# Patient Record
Sex: FEMALE | Race: BLACK OR AFRICAN AMERICAN | NOT HISPANIC OR LATINO | Employment: FULL TIME | URBAN - METROPOLITAN AREA
[De-identification: names, ages, dates, MRNs, and addresses within clinical notes are randomized per-mention and may not be internally consistent; named-entity substitution may affect disease eponyms.]

---

## 2017-05-04 ENCOUNTER — OFFICE VISIT (OUTPATIENT)
Dept: SURGERY | Facility: CLINIC | Age: 52
End: 2017-05-04
Payer: COMMERCIAL

## 2017-05-04 VITALS — BODY MASS INDEX: 37.89 KG/M2 | HEIGHT: 63 IN | WEIGHT: 213.88 LBS | TEMPERATURE: 98 F | RESPIRATION RATE: 16 BRPM

## 2017-05-04 DIAGNOSIS — K21.9 GASTROESOPHAGEAL REFLUX DISEASE, ESOPHAGITIS PRESENCE NOT SPECIFIED: ICD-10-CM

## 2017-05-04 DIAGNOSIS — M19.90 ARTHRITIS: ICD-10-CM

## 2017-05-04 DIAGNOSIS — E66.01 MORBID OBESITY DUE TO EXCESS CALORIES: Primary | ICD-10-CM

## 2017-05-04 PROCEDURE — 1160F RVW MEDS BY RX/DR IN RCRD: CPT | Mod: S$GLB,,, | Performed by: SURGERY

## 2017-05-04 PROCEDURE — 99204 OFFICE O/P NEW MOD 45 MIN: CPT | Mod: S$GLB,,, | Performed by: SURGERY

## 2017-05-04 RX ORDER — LANOLIN ALCOHOL/MO/W.PET/CERES
400 CREAM (GRAM) TOPICAL DAILY
COMMUNITY

## 2017-05-04 RX ORDER — SERTRALINE HYDROCHLORIDE 100 MG/1
100 TABLET, FILM COATED ORAL DAILY
COMMUNITY

## 2017-05-04 RX ORDER — OMEPRAZOLE 40 MG/1
40 CAPSULE, DELAYED RELEASE ORAL DAILY
COMMUNITY
End: 2018-06-14 | Stop reason: SDUPTHER

## 2017-05-04 RX ORDER — ASPIRIN 81 MG/1
81 TABLET ORAL DAILY
COMMUNITY

## 2017-05-04 RX ORDER — CHOLECALCIFEROL (VITAMIN D3) 25 MCG
3000 TABLET ORAL DAILY
COMMUNITY
End: 2018-06-14

## 2017-05-04 NOTE — MR AVS SNAPSHOT
[facility]  [address] [place] [address]  Phone: [phone]  Fax: [phone]                  Simon Pascual   2017 9:40 AM   Office Visit    Description:  Unknown : 1965   Provider:  Faith King MD   Department:  [facility]           Reason for Visit     Obesity           Diagnoses this Visit        Comments    Morbid obesity due to excess calories    -  Primary     Obesity, Class II, BMI 35-39.9, with comorbidity         Arthritis         Gastroesophageal reflux disease, esophagitis presence not specified                To Do List           Future Appointments        Provider Department Dept Phone    2017 9:30 AM Myriam Ramirez RD [place] [phone]    2017 9:20 AM Faith King MD [facility] [phone]      Goals (5 Years of Data)     None      Follow-Up and Disposition     Return in about 1 month (around 2017).      [facility] Call     [facility] On Call Nurse Care Line -  Assistance  Unless otherwise directed by your provider, please contact [facility] On-Call, our nurse care line that is available for  assistance.     Registered nurses in the [facility] On Call Center provide: appointment scheduling, clinical advisement, health education, and other advisory services.  Call: [phone] (toll free)               Medications           Message regarding Medications     Verify the changes and/or additions to your medication regime listed below are the same as discussed with your clinician today.  If any of these changes or additions are incorrect, please notify your healthcare provider.             Verify that the below list of medications is an accurate representation of the medications you are currently taking.  If none reported, the list may be blank. If incorrect, please contact your healthcare provider. Carry this list with you in case of emergency.     "       Current Medications     aspirin (ECOTRIN) 81 MG EC tablet Take 81 mg by mouth once daily.    aspirin-acetaminophen-caffeine 250-250-65 mg (EXCEDRIN MIGRAINE) 250-250-65 mg per tablet Take 1 tablet by mouth every 6 (six) hours as needed for Pain.    magnesium oxide (MAG-OX) 400 mg tablet Take 400 mg by mouth once daily.    omeprazole (PRILOSEC) 40 MG capsule Take 40 mg by mouth once daily.    PRENATAL VIT/IRON FUMARATE/FA (PRENATAL 1+1 ORAL) Take by mouth.    RIBOFLAVIN (VITAMIN B-2 ORAL) Take 200 mg by mouth 2 (two) times daily.    sertraline (ZOLOFT) 100 MG tablet Take 100 mg by mouth once daily.    vitamin D 1000 units Tab Take 3,000 Units by mouth once daily.           Clinical Reference Information           Your Vitals Were     Temp Resp Height Weight BMI    98.1 °F (36.7 °C) (Oral) 16 5' 2.5" (1.588 m) 97 kg (213 lb 14.4 oz) 38.5 kg/m2      Allergies as of 5/4/2017     No Known Allergies      Immunizations Administered on Date of Encounter - 5/4/2017     None      Language Assistance Services     ATTENTION: Language assistance services are available, free of charge. Please call [phone].      ATENCIÓN: Si julissala romel, tiene a godoy disposición servicios gratuitos de asistencia lingüística. Llame al [phone].     OLIVIA Ý: N?u b?n nói Ti?ng Vi?t, có các d?ch v? h? tr? ngôn ng? mi?n phí dành cho b?n. G?i s? [phone].         [facility] complies with applicable Federal civil rights laws and does not discriminate on the basis of race, color, national origin, age, disability, or sex.        "

## 2017-05-18 ENCOUNTER — NUTRITION (OUTPATIENT)
Dept: NUTRITION | Facility: CLINIC | Age: 52
End: 2017-05-18
Payer: COMMERCIAL

## 2017-05-18 VITALS — WEIGHT: 212 LBS | BODY MASS INDEX: 38.16 KG/M2

## 2017-05-18 DIAGNOSIS — E66.9 OBESITY, UNSPECIFIED OBESITY SEVERITY, UNSPECIFIED OBESITY TYPE: Primary | ICD-10-CM

## 2017-05-18 PROCEDURE — 97802 MEDICAL NUTRITION INDIV IN: CPT | Mod: S$GLB,,, | Performed by: DIETITIAN, REGISTERED

## 2017-05-18 NOTE — PROGRESS NOTES
PCP: Primary Doctor No  REFERRING PROVIDER:  Faith King MD      HISTORY OF PRESENT ILLNESS:  51 y.o. unknown patient is in clinic today for bariatric surgery assessment. Patient interested in gastric sleeve.     Patient denies nausea, vomiting and diarrhea. Weight difficulties for 10 years.  Weight loss strategies include Unsupervised: atkins, gym membership, herbal life, home gym equipment, slim fast; Supervised: Acupuncture, diet pills from gail MEDRANO, nutri-system; Diet pills: dexatrim, fastin, metabolife; Exercise attempts: treadmill, group classes, boxing.     VITAL SIGNS:  Height: 62.5 in  Weight: 212 lb   IBW: 113 lbs +/-10%    ALLERGIES & MEDICATIONS: Reviewed and Reconciled  MEDICAL/SURGICAL & FAMILY HISTORY: Reviewed and Reconciled    LABORATORY:  A1C: No results found for: HGBA1C  Chol: No results found for: CHOL  Trig: No results found for: TRIG  HDL: No results found for: HDL  LDL: No components found for: LDL   BUN:    No results found for: BUN  AST:  No results found for: AST      ALT:  No results found for: ALT  Micro/Cr Ratio:  No results found for: CREATININE    SELF-MONITORING:  Food - none are avail    ACTIVITY LEVEL: Aerobic 20-30 min 3-4 d/wk. Resistance none. Treadmill, stairs up and down, 3-4x a week- 2 miles     NUTRITION INTAKE: Meal patterns include 3 meals, 1-2 snacks daily   Wake up  B - pancakes and [place], coffee- 2 cups in morning and drinks more throughout the day   S - sleeps  5 pm L - bowl of cereal  D - take- sandwich, grapefruit   Beverages - lemon water, coffee   Dining out - rarely   Alcohol- glass of wine -2-3x a week   Supplements- MVI, mag-ox, b2, vit d   Protein shake- not yet     Night shift- 12 hr      Changes made to lifestyle since meeting with Dr. King- cut out rice, pasta, grits  If too tired will not eat and then will overeat     PSYCHOSOCIAL: Stage of change - preparation  Barriers to change - none  Motivation to change (10high): 8  Predicted  compliance (10high): 8  Realistic expectation for wt loss (10high): 8  Verbalizes understanding of dietary changes post procedure and willingness to participate in physical activity (10high): 9    MNT ASSESSMENT:   1200 calories, 60-70 grams protein daily  increase fruit 2 serv/d, vegetables 2+ cups/d, whole grains 3+serv/d, lowfat dairy 3+serv/d  low-fat, low-sodium  150 min physical activity per week, moderate intensity, as tolerated    PLAN:    Reviewed MNT guidelines for obesity and weight management.   Encouraged daily self-monitoring of food & activity patterns.    Provided pre & post surgery meal-planning instruction via bariatric diet manual, foodlists, plate method, food models, food labels and/or ADA booklet. Reviewed micro/macronutrient effect on weight management. Discussed carbohydrate counting and spacing techniques with emphasis on supplementation necessary for altered metabolism. Reviewed principles of energy metabolism to assist weight and health management.                                                          Discussed physical activity with review of benefits, methods, precautions.    Discussed bx strategies for improving, strategies for improving social & environmental support of lifestyle changes.     GOALS: Self monitoring: daily food & activity journal. Meal plan-90% accuracy, Physical activity-150 minutes per week.   FU: as needed per bariatric MD   Visit Time Spent:  60 minutes    Thank you for the opportunity to work with your patient.    Myriam Ramirez RD, LDN  Bariatric Dietitian

## 2017-06-01 ENCOUNTER — OFFICE VISIT (OUTPATIENT)
Dept: SURGERY | Facility: CLINIC | Age: 52
End: 2017-06-01
Payer: COMMERCIAL

## 2017-06-01 VITALS
DIASTOLIC BLOOD PRESSURE: 84 MMHG | HEART RATE: 63 BPM | HEIGHT: 63 IN | SYSTOLIC BLOOD PRESSURE: 139 MMHG | TEMPERATURE: 98 F | BODY MASS INDEX: 38.62 KG/M2 | WEIGHT: 218 LBS | RESPIRATION RATE: 16 BRPM

## 2017-06-01 DIAGNOSIS — M19.90 ARTHRITIS: ICD-10-CM

## 2017-06-01 DIAGNOSIS — E78.5 HYPERLIPIDEMIA, UNSPECIFIED HYPERLIPIDEMIA TYPE: ICD-10-CM

## 2017-06-01 DIAGNOSIS — E66.01 MORBID OBESITY DUE TO EXCESS CALORIES: Primary | ICD-10-CM

## 2017-06-01 PROCEDURE — 99213 OFFICE O/P EST LOW 20 MIN: CPT | Mod: S$GLB,,, | Performed by: SURGERY

## 2017-06-01 NOTE — PROGRESS NOTES
Medically Supervised Weight Loss Documentation    Date of Visit: 06/01/2017    Patient: Simon Pascual    Current Weight: 218  Current BMI: Body mass index is 39.24 kg/m².  Weight Change: +5 pounds    Last Weight: 213    Beginning Weight: 213      Vitals:   Vitals:    06/01/17 0854   BP: 139/84   Pulse: 63   Resp: 16   Temp: 98.2 °F (36.8 °C)       Comorbidities:   Past Medical History:   Diagnosis Date    Arthritis     in left hip, bilateral knees, and right shoulder    GERD (gastroesophageal reflux disease)     Hyperlipidemia     Migraine        Medications:  Current Outpatient Prescriptions on File Prior to Visit   Medication Sig Dispense Refill    aspirin (ECOTRIN) 81 MG EC tablet Take 81 mg by mouth once daily.      aspirin-acetaminophen-caffeine 250-250-65 mg (EXCEDRIN MIGRAINE) 250-250-65 mg per tablet Take 1 tablet by mouth every 6 (six) hours as needed for Pain.      magnesium oxide (MAG-OX) 400 mg tablet Take 400 mg by mouth once daily.      omeprazole (PRILOSEC) 40 MG capsule Take 40 mg by mouth once daily.      PRENATAL VIT/IRON FUMARATE/FA (PRENATAL 1+1 ORAL) Take by mouth.      RIBOFLAVIN (VITAMIN B-2 ORAL) Take 200 mg by mouth 2 (two) times daily.      sertraline (ZOLOFT) 100 MG tablet Take 100 mg by mouth once daily.      vitamin D 1000 units Tab Take 3,000 Units by mouth once daily.       No current facility-administered medications on file prior to visit.          Body comp:  Fat Percent:  44.4 %  Fat Mass:  97 lb  FFM:  121 lb  TBW: 88.5 lb  BMR: 1659 kcal      Diet Education Discussed:    Breakfast:  Oatmeal, grape fruit, boiled eggs, yogurt  Lunch:  salad  Dinner:  Baked ham, sometimes just vegetables alone  Snacks on popcorn    Exercise/Activity Discussed:    Treadmill 3 times per week for 60 minutes    Behavior or Diet Goals for this patient:    Weight gain is mainly from water retention  We talked about stopping oatmeal and fruits  We talked about finding a protein shake she likes  to use as a meal replacement  Stop popcorn  Continue exercising    Labs - reviewed, pending thyroid function, high cholesterol  EKG - request  UGI - normal  dietary consult- done  psych consult - pending  Seminar- done    1/3 MSD    I will obtain the following clearances prior to surgery: none    : I met with the patient for 15 minutes and counseled her for over 50% of that time

## 2017-07-13 ENCOUNTER — OFFICE VISIT (OUTPATIENT)
Dept: SURGERY | Facility: CLINIC | Age: 52
End: 2017-07-13
Payer: COMMERCIAL

## 2017-07-13 VITALS
RESPIRATION RATE: 16 BRPM | HEART RATE: 73 BPM | HEIGHT: 63 IN | TEMPERATURE: 98 F | SYSTOLIC BLOOD PRESSURE: 136 MMHG | BODY MASS INDEX: 37.56 KG/M2 | WEIGHT: 212 LBS | DIASTOLIC BLOOD PRESSURE: 79 MMHG

## 2017-07-13 DIAGNOSIS — M19.90 ARTHRITIS: ICD-10-CM

## 2017-07-13 DIAGNOSIS — E78.5 HYPERLIPIDEMIA, UNSPECIFIED HYPERLIPIDEMIA TYPE: ICD-10-CM

## 2017-07-13 DIAGNOSIS — E66.01 MORBID OBESITY DUE TO EXCESS CALORIES: Primary | ICD-10-CM

## 2017-07-13 PROCEDURE — 99213 OFFICE O/P EST LOW 20 MIN: CPT | Mod: S$GLB,,, | Performed by: SURGERY

## 2017-07-13 RX ORDER — GABAPENTIN 600 MG/1
600 TABLET ORAL DAILY
COMMUNITY

## 2017-07-13 NOTE — PROGRESS NOTES
Medically Supervised Weight Loss Documentation    Date of Visit: 07/13/2017    Patient: Simon Pascual    Current Weight: 212  Current BMI: Body mass index is 38.16 kg/m².  Weight Change: -1 pounds    Last Weight: 218    Beginning Weight: 213      Vitals:   Vitals:    07/13/17 0901   BP: 136/79   Pulse: 73   Resp: 16   Temp: 97.9 °F (36.6 °C)       Comorbidities:   Past Medical History:   Diagnosis Date    Arthritis     in left hip, bilateral knees, and right shoulder    GERD (gastroesophageal reflux disease)     Hyperlipidemia     Migraine        Medications:  Current Outpatient Prescriptions on File Prior to Visit   Medication Sig Dispense Refill    aspirin (ECOTRIN) 81 MG EC tablet Take 81 mg by mouth once daily.      aspirin-acetaminophen-caffeine 250-250-65 mg (EXCEDRIN MIGRAINE) 250-250-65 mg per tablet Take 1 tablet by mouth every 6 (six) hours as needed for Pain.      magnesium oxide (MAG-OX) 400 mg tablet Take 400 mg by mouth once daily.      omeprazole (PRILOSEC) 40 MG capsule Take 40 mg by mouth once daily.      RIBOFLAVIN (VITAMIN B-2 ORAL) Take 200 mg by mouth 2 (two) times daily.      sertraline (ZOLOFT) 100 MG tablet Take 100 mg by mouth once daily.      vitamin D 1000 units Tab Take 3,000 Units by mouth once daily.      [DISCONTINUED] PRENATAL VIT/IRON FUMARATE/FA (PRENATAL 1+1 ORAL) Take by mouth.       No current facility-administered medications on file prior to visit.          Body comp:  Fat Percent:  46.3 %  Fat Mass:  98 lb  FFM:  114 lb  TBW: 83.5 lb    BMR: 1633 kcal      Diet Education Discussed:    Breakfast:  salmon  Lunch:  Protein shake  Dinner:  2 eggs and coffee  Snack: none really    Exercise/Activity Discussed:    Stopped for pain in left foot    Behavior or Diet Goals for this patient:    Diet is doing well.  We talked about maintaining this over time and working on portion control.    We need to find a way to exercise. I recommended using a foot peddler for her arms  (examples shown to patient).  She has been diagnosed with arthritis in that foot and is getting an MRI from another physician to r/o stress fracture.    Osteoarthritis in left foot  Labs - reviewed, pending thyroid function, high cholesterol  EKG - request  UGI - normal  dietary consult- done  psych consult - pending  Seminar- done    2/3 MSD    I will obtain the following clearances prior to surgery: none    : I met with the patient for 15 minutes and counseled her for over 50% of that time

## 2017-08-10 ENCOUNTER — OFFICE VISIT (OUTPATIENT)
Dept: SURGERY | Facility: CLINIC | Age: 52
End: 2017-08-10
Payer: COMMERCIAL

## 2017-08-10 VITALS
HEIGHT: 63 IN | SYSTOLIC BLOOD PRESSURE: 138 MMHG | DIASTOLIC BLOOD PRESSURE: 87 MMHG | BODY MASS INDEX: 37.47 KG/M2 | TEMPERATURE: 99 F | HEART RATE: 78 BPM | RESPIRATION RATE: 16 BRPM | WEIGHT: 211.5 LBS

## 2017-08-10 DIAGNOSIS — M19.90 ARTHRITIS: ICD-10-CM

## 2017-08-10 DIAGNOSIS — E66.01 MORBID OBESITY DUE TO EXCESS CALORIES: Primary | ICD-10-CM

## 2017-08-10 DIAGNOSIS — E78.5 HYPERLIPIDEMIA, UNSPECIFIED HYPERLIPIDEMIA TYPE: ICD-10-CM

## 2017-08-10 PROCEDURE — 99214 OFFICE O/P EST MOD 30 MIN: CPT | Mod: S$GLB,,, | Performed by: SURGERY

## 2017-08-10 PROCEDURE — 3008F BODY MASS INDEX DOCD: CPT | Mod: S$GLB,,, | Performed by: SURGERY

## 2017-08-10 NOTE — PATIENT INSTRUCTIONS
Pre op Diet-Start Today    Breakfast- protein shake  Lunch- protein shake  Dinner- 3 ounces of meat and vegetables ( from list)    NO SNACKING  Only water to drink

## 2017-08-10 NOTE — PROGRESS NOTES
Follow up    SUBJECTIVE:     Chief Complaint   Patient presents with    Obesity       History of Present Illness:    Patient is a 51 y.o. female who is referred for evaluation of surgical treatment of morbid obesity. Her Body mass index is 38.07 kg/m².  She has known comorbidities of dyslipidemia, GERD, osteoarthritis, peripheral edema and urinary incontinence. She has attended the bariatric seminar and is most interested in gastric sleeve surgery.     Diet:  Breakfast: eggs sausage  Lunch: protein shake  Dinner: salmon  Snacks: peanuts- 1 small bag     Exercise:  Mowing lawn- weekly  Treadmill twice a week 40 minutes- 3 mph      Review of patient's allergies indicates:  No Known Allergies    Current Outpatient Prescriptions   Medication Sig Dispense Refill    aspirin (ECOTRIN) 81 MG EC tablet Take 81 mg by mouth once daily.      aspirin-acetaminophen-caffeine 250-250-65 mg (EXCEDRIN MIGRAINE) 250-250-65 mg per tablet Take 1 tablet by mouth every 6 (six) hours as needed for Pain.      gabapentin (NEURONTIN) 600 MG tablet Take 600 mg by mouth once daily.      magnesium oxide (MAG-OX) 400 mg tablet Take 400 mg by mouth once daily.      MULTIVIT-IRON-MIN-FOLIC ACID 3,500-18-0.4 UNIT-MG-MG ORAL CHEW Take by mouth.      MULTIVIT-MINERALS/FOLIC ACID (CENTRUM MULTIGUMMIES ORAL) Take by mouth.      omeprazole (PRILOSEC) 40 MG capsule Take 40 mg by mouth once daily.      RIBOFLAVIN (VITAMIN B-2 ORAL) Take 200 mg by mouth 2 (two) times daily.      sertraline (ZOLOFT) 100 MG tablet Take 100 mg by mouth once daily.      vitamin D 1000 units Tab Take 3,000 Units by mouth once daily.       No current facility-administered medications for this visit.        Past Medical History:   Diagnosis Date    Arthritis     in left hip, bilateral knees, and right shoulder    GERD (gastroesophageal reflux disease)     Hyperlipidemia     Migraine      Past Surgical History:   Procedure Laterality Date    BREAST LUMPECTOMY        SECTION      HERNIA REPAIR  1975    navel and lower abdomen    HYSTERECTOMY      KNEE ARTHROPLASTY Right 2015    for arthitis- In [place]    ROTATOR CUFF REPAIR Right     TONSILLECTOMY      TRANSPHENOIDAL PITUITARY RESECTION       Family History   Problem Relation Age of Onset    Hypertension Mother     Obesity Mother     Hypertension Father     Obesity Father     Obesity Sister     Hypertension Brother     Cancer Maternal Grandmother      ovarian    Cancer Maternal Grandfather     Obesity Sister      Social History   Substance Use Topics    Smoking status: Never Smoker    Smokeless tobacco: Not on file    Alcohol use Yes      Comment: socially        Review of Systems:  Review of Systems   Constitutional: Positive for diaphoresis and fatigue. Negative for activity change, appetite change, chills, fever and unexpected weight change.   HENT: Positive for voice change. Negative for congestion, rhinorrhea, sinus pressure, sneezing, sore throat and trouble swallowing.    Eyes: Negative for pain, redness and itching.   Respiratory: Negative for apnea, cough, choking, chest tightness, shortness of breath, wheezing and stridor.    Cardiovascular: Positive for leg swelling. Negative for chest pain and palpitations.   Gastrointestinal: Positive for nausea. Negative for abdominal distention, abdominal pain, anal bleeding, blood in stool, constipation, diarrhea and vomiting.   Endocrine: Positive for heat intolerance. Negative for cold intolerance.   Genitourinary: Negative for difficulty urinating, dysuria and hematuria.   Musculoskeletal: Positive for arthralgias, back pain, gait problem and joint swelling. Negative for myalgias, neck pain and neck stiffness.   Skin: Negative for rash and wound.   Allergic/Immunologic: Negative for environmental allergies and food allergies.   Neurological: Positive for headaches. Negative for dizziness and light-headedness.   Hematological: Negative for  "adenopathy. Bruises/bleeds easily.   Psychiatric/Behavioral: Negative for agitation, confusion and decreased concentration.       OBJECTIVE:     Vital Signs (Most Recent)  Temp: 98.7 °F (37.1 °C) (08/10/17 0904)  Pulse: 78 (08/10/17 0904)  Resp: 16 (08/10/17 0904)  BP: 138/87 (08/10/17 0904)  5' 2.5" (1.588 m)  95.9 kg (211 lb 8 oz)       Physical Exam:  Physical Exam   Constitutional: She is oriented to person, place, and time. She appears well-developed and well-nourished. No distress.   HENT:   Head: Normocephalic and atraumatic.   Eyes: EOM are normal. Pupils are equal, round, and reactive to light. Right eye exhibits no discharge. Left eye exhibits no discharge.   Neck: Normal range of motion. Neck supple. No JVD present. No tracheal deviation present. No thyromegaly present.   Cardiovascular: Normal rate and regular rhythm.  Exam reveals no gallop and no friction rub.    No murmur heard.  Pulmonary/Chest: Effort normal and breath sounds normal. No respiratory distress. She has no wheezes. She has no rales. She exhibits no tenderness.   Abdominal: Soft. Bowel sounds are normal. She exhibits no distension and no mass. There is no tenderness. There is no rebound and no guarding. No hernia.   Well healed incision   Musculoskeletal: Normal range of motion. She exhibits no edema, tenderness or deformity.   Lymphadenopathy:     She has no cervical adenopathy.   Neurological: She is alert and oriented to person, place, and time. No cranial nerve deficit. Coordination normal.   Skin: Skin is warm and dry. No rash noted. She is not diaphoretic. No erythema.       ASSESSMENT/PLAN:        Osteoarthritis in left foot  Labs -total cholesterol 321, ,   Normal ft4, t4, and total t3    UGI - normal  dietary consult- done  psych consult - Clear- see media  Seminar- done    3/3 MSD    I will obtain the following clearances prior to surgery: none    1. Morbid obesity due to excess calories     2. Obesity, Class II, " BMI 35.0-39.9, with comorbidity (see actual BMI)     3. Arthritis     4. Hyperlipidemia, unspecified hyperlipidemia type         Plan:  Simon Pascual has morbid obesity as their Body mass index is 38.07 kg/m². She would benefit from weight loss surgery and has chosen gastric sleeve surgery as the preferred procedure. She understands that this is a tool and lifestyle change will be necessary to keep weight off. I went over possible complications of the chosen surgery with the patient and she is agreeable to surgery.    She has been instructed to start the pre operative diet.    She surgical date is August 24, planning for 1 day hospitalization- In [place]      The patient has been taught the post operative diet and understands that she will need to stay on this diet to prevent complication.  They are aware of the post operative follow up and return to see me after surgery to treat/prevent/identify any complications.  she has been advised to attend support groups post operatively.

## 2017-08-22 ENCOUNTER — DOCUMENTATION ONLY (OUTPATIENT)
Dept: BARIATRICS | Facility: CLINIC | Age: 52
End: 2017-08-22

## 2017-08-22 NOTE — NURSING
Pt weigh in before surgery    tanita scale: 208.2lb wt           37.5bmi           46.9% fat           97.6lb fat mass           110.6lb ffm           79.6lb tbw    Pt successfully return demonstrated use of incentive spirometer.

## 2017-08-25 RX ORDER — DIAZEPAM 2 MG/1
2 TABLET ORAL EVERY 6 HOURS PRN
Qty: 20 TABLET | Refills: 0 | Status: SHIPPED | OUTPATIENT
Start: 2017-08-25 | End: 2017-09-24

## 2017-08-25 RX ORDER — URSODIOL 500 MG/1
500 TABLET, FILM COATED ORAL DAILY
Qty: 30 TABLET | Refills: 3 | Status: SHIPPED | OUTPATIENT
Start: 2017-08-25 | End: 2017-11-30

## 2017-08-25 RX ORDER — OMEPRAZOLE 20 MG/1
20 CAPSULE, DELAYED RELEASE ORAL DAILY
Qty: 30 CAPSULE | Refills: 3 | Status: SHIPPED | OUTPATIENT
Start: 2017-08-25 | End: 2018-03-08 | Stop reason: SDUPTHER

## 2017-08-25 RX ORDER — HYDROCODONE BITARTRATE AND ACETAMINOPHEN 7.5; 325 MG/15ML; MG/15ML
30 SOLUTION ORAL EVERY 4 HOURS PRN
Qty: 473 ML | Refills: 0 | Status: SHIPPED | OUTPATIENT
Start: 2017-08-25 | End: 2018-03-08 | Stop reason: ALTCHOICE

## 2017-08-25 RX ORDER — ONDANSETRON 4 MG/1
4 TABLET, ORALLY DISINTEGRATING ORAL EVERY 6 HOURS PRN
Qty: 30 TABLET | Refills: 0 | Status: SHIPPED | OUTPATIENT
Start: 2017-08-25

## 2017-08-30 ENCOUNTER — PATIENT MESSAGE (OUTPATIENT)
Dept: SURGERY | Facility: CLINIC | Age: 52
End: 2017-08-30

## 2017-09-07 ENCOUNTER — OFFICE VISIT (OUTPATIENT)
Dept: SURGERY | Facility: CLINIC | Age: 52
End: 2017-09-07
Payer: COMMERCIAL

## 2017-09-07 VITALS
WEIGHT: 192 LBS | BODY MASS INDEX: 34.02 KG/M2 | SYSTOLIC BLOOD PRESSURE: 123 MMHG | DIASTOLIC BLOOD PRESSURE: 79 MMHG | HEIGHT: 63 IN | HEART RATE: 16 BPM

## 2017-09-07 DIAGNOSIS — M19.90 ARTHRITIS: ICD-10-CM

## 2017-09-07 DIAGNOSIS — E78.5 HYPERLIPIDEMIA, UNSPECIFIED HYPERLIPIDEMIA TYPE: ICD-10-CM

## 2017-09-07 DIAGNOSIS — E66.01 MORBID OBESITY DUE TO EXCESS CALORIES: Primary | ICD-10-CM

## 2017-09-07 PROCEDURE — 99024 POSTOP FOLLOW-UP VISIT: CPT | Mod: S$GLB,,, | Performed by: SURGERY

## 2017-09-07 NOTE — PROGRESS NOTES
Post Op Note    Surgery: gastric sleeve surgery  Date: 8/24/17  Initial weight: 213  Last weight: 211  Current weight: 192    Constipation: none  Reflux: none  Vomiting: none    Diet:    Water 32 ounces  Protein shake 1 per day  Soup- 1 can daily    Exercise:  Hurt left knee and has limited mobility  Doing 10 minutes of dumbell exercises daily    MVI: 1 per day    Vitals:    09/07/17 0842   BP: 123/79   Pulse: (!) 16       Body comp:  Fat Percent:  46 %  Fat Mass:  88.5 lb  TBW: 103.5 lb  BMR: 1546 kcal    PE:  NAD  RRR  Soft/nt/nd  Incisions: well healed    Labs: none    A/P: s/p sleeve gastrectomy doing well     Counseling for patient:    Diet: continue protocol  Exercise: ok for cardio no heavy lifting over 30 pounds  Vitamins: 2 mvi, calcium, tums  Co morbidities:     1. Morbid obesity due to excess calories     2. Obesity, Class II, BMI 35.0-39.9, with comorbidity (see actual BMI)     3. Hyperlipidemia, unspecified hyperlipidemia type     4. Arthritis         : I met with the patient for 15 minutes and counseled her for over 50% of that time

## 2017-09-07 NOTE — LETTER
September 7, 2017      [facility]  [address] [place] [address]  Phone: [phone]  Fax: [phone]       Patient: Simon Pascual   YOB: 1965  Date of Visit: 09/07/2017    To Whom It May Concern:    Ekaterina Pascual  was at [facility] on 09/07/2017. She may return to work/school on 9/3 with no restrictions. If you have any questions or concerns, or if I can be of further assistance, please do not hesitate to contact me.    Sincerely,    Faith King MD

## 2017-10-05 ENCOUNTER — OFFICE VISIT (OUTPATIENT)
Dept: SURGERY | Facility: CLINIC | Age: 52
End: 2017-10-05
Payer: COMMERCIAL

## 2017-10-05 VITALS
BODY MASS INDEX: 31.98 KG/M2 | SYSTOLIC BLOOD PRESSURE: 118 MMHG | DIASTOLIC BLOOD PRESSURE: 78 MMHG | HEART RATE: 60 BPM | HEIGHT: 63 IN | RESPIRATION RATE: 16 BRPM | WEIGHT: 180.5 LBS

## 2017-10-05 DIAGNOSIS — M19.90 ARTHRITIS: ICD-10-CM

## 2017-10-05 DIAGNOSIS — E66.01 MORBID OBESITY DUE TO EXCESS CALORIES: Primary | ICD-10-CM

## 2017-10-05 PROCEDURE — 99024 POSTOP FOLLOW-UP VISIT: CPT | Mod: S$GLB,,, | Performed by: SURGERY

## 2017-10-05 NOTE — PROGRESS NOTES
Post Op Note    Surgery: gastric sleeve surgery  Date: 8/24/17  Initial weight: 213  Last weight: 192  Current weight: 180    Constipation: none  Reflux: none  Vomiting: none    Diet:    Went on cruise last week and had some cheats  Mainly eating seafood and veggies  Snacks on protein shakes    Exercise:  Treadmill 3-6 times per week for 1 mile    MVI: 2 per day, calcium and b complex    Vitals:    10/05/17 0837   BP: 118/78   Pulse: 60   Resp: 16       Body comp:  Fat Percent:  40.9 %  Fat Mass:  74 lb  FFM:  106.5 lb  TBW: 78 lb    BMR: 1496 kcal    PE:  NAD  RRR  Soft/nt/nd  Incisions: well healed    Labs: none    A/P: s/p sleeve     Counseling for patient:    Diet: doing well, continue to keep portion sizes small  Exercise: goal at 9 months 20 minutes of cardio, 20 minutes of strength exercises, ok for heavy lifting  Vitamins: continue regimen    Co morbidities:     1. Morbid obesity due to excess calories     2. Obesity, Class II, BMI 35.0-39.9, with comorbidity (see actual BMI)     3. Arthritis         : I met with the patient for 15 minutes and counseled her for over 50% of that time

## 2017-11-27 ENCOUNTER — TELEPHONE (OUTPATIENT)
Dept: BARIATRICS | Facility: CLINIC | Age: 52
End: 2017-11-27

## 2017-11-27 NOTE — TELEPHONE ENCOUNTER
----- Message from Loli Link sent at 11/27/2017 12:28 PM CST -----  Contact: Jana lobo/Lab @ [facility] [phone]  She is requesting you fax the order for the iron studies to her at [phone].  Thank you!

## 2017-11-30 ENCOUNTER — OFFICE VISIT (OUTPATIENT)
Dept: SURGERY | Facility: CLINIC | Age: 52
End: 2017-11-30
Payer: COMMERCIAL

## 2017-11-30 VITALS
RESPIRATION RATE: 16 BRPM | SYSTOLIC BLOOD PRESSURE: 127 MMHG | HEIGHT: 63 IN | WEIGHT: 159 LBS | BODY MASS INDEX: 28.17 KG/M2 | HEART RATE: 65 BPM | DIASTOLIC BLOOD PRESSURE: 78 MMHG

## 2017-11-30 DIAGNOSIS — E66.01 MORBID OBESITY DUE TO EXCESS CALORIES: Primary | ICD-10-CM

## 2017-11-30 DIAGNOSIS — M19.90 ARTHRITIS: ICD-10-CM

## 2017-11-30 DIAGNOSIS — K21.9 GASTROESOPHAGEAL REFLUX DISEASE, ESOPHAGITIS PRESENCE NOT SPECIFIED: ICD-10-CM

## 2017-11-30 DIAGNOSIS — Z98.84 S/P LAPAROSCOPIC SLEEVE GASTRECTOMY: ICD-10-CM

## 2017-11-30 DIAGNOSIS — E78.5 HYPERLIPIDEMIA, UNSPECIFIED HYPERLIPIDEMIA TYPE: ICD-10-CM

## 2017-11-30 PROCEDURE — 99213 OFFICE O/P EST LOW 20 MIN: CPT | Mod: S$GLB,,, | Performed by: SURGERY

## 2017-11-30 NOTE — PROGRESS NOTES
Post Op Note    Surgery: gastric sleeve surgery  Date: 8/24/17  Initial weight: 213  Last weight: 180  Current weight: 159    Constipation: none  Reflux: none  Vomiting: none    Diet:  Breakfast:  Protein shake  Lunch: soup  Dinner: acorn squash, some meat but very little  Snack: sugar free pudding  Protein shake: premier    Exercise:  Knee surgery recently, doing physical therapy    MVI: 2 per day, calcium and b complex    Vitals:    11/30/17 0833   BP: 127/78   Pulse: 65   Resp: 16       Body comp:  Fat Percent:  36.1 %  Fat Mass:  57.5 lb  FFM:  101.5 lb  TBW: 74.5 lb  BMR: 1400 kcal    PE:  NAD  RRR  Soft/nt/nd  Incisions: well healed    Labs: elevated triglycerides and LDL (non fasting labs)    A/P: s/p sleeve      Counseling for patient:    Diet: keep portions small, try meats in small amounts, continue low carb  Exercise: continue physical therapy, start cardio when knee is clear for exercising  Vitamins: continue regimen,  B12, vitamin d and b1 labs currently pending will review  Co morbidities:     1. Morbid obesity due to excess calories      losing significant weight will monitor for regain     2. Obesity, Class II, BMI 35.0-39.9, with comorbidity (see actual BMI)     3. Arthritis      recent knee replacement, in pt   4. Hyperlipidemia, unspecified hyperlipidemia type      labs show elevation of LDL and tc but this likely from eating cream 2 hours before labs (in coffee)   5. S/P laparoscopic sleeve gastrectomy     6. Gastroesophageal reflux disease, esophagitis presence not specified      controlled       : I met with the patient for 15 minutes and counseled her for over 50% of that time

## 2018-03-08 ENCOUNTER — TELEPHONE (OUTPATIENT)
Dept: SURGERY | Facility: CLINIC | Age: 53
End: 2018-03-08

## 2018-03-08 ENCOUNTER — OFFICE VISIT (OUTPATIENT)
Dept: SURGERY | Facility: CLINIC | Age: 53
End: 2018-03-08
Payer: COMMERCIAL

## 2018-03-08 VITALS
HEIGHT: 63 IN | DIASTOLIC BLOOD PRESSURE: 79 MMHG | BODY MASS INDEX: 25.16 KG/M2 | TEMPERATURE: 98 F | HEART RATE: 56 BPM | RESPIRATION RATE: 16 BRPM | SYSTOLIC BLOOD PRESSURE: 127 MMHG | WEIGHT: 142 LBS

## 2018-03-08 DIAGNOSIS — E66.01 MORBID OBESITY DUE TO EXCESS CALORIES: Primary | ICD-10-CM

## 2018-03-08 DIAGNOSIS — Z98.84 S/P LAPAROSCOPIC SLEEVE GASTRECTOMY: ICD-10-CM

## 2018-03-08 DIAGNOSIS — E78.5 HYPERLIPIDEMIA, UNSPECIFIED HYPERLIPIDEMIA TYPE: ICD-10-CM

## 2018-03-08 DIAGNOSIS — M19.90 ARTHRITIS: ICD-10-CM

## 2018-03-08 PROCEDURE — 99213 OFFICE O/P EST LOW 20 MIN: CPT | Mod: S$GLB,,, | Performed by: SURGERY

## 2018-03-08 NOTE — TELEPHONE ENCOUNTER
----- Message from Izabel Arreguin sent at 3/8/2018  7:55 AM CST -----  Contact: Patient  Simon, patient [phone], Patient running about 20 minutes late. Call to POD. Advised patient as to late policy. Please advise. Thanks.

## 2018-03-08 NOTE — PROGRESS NOTES
Post Op Note    Surgery: gastric sleeve surgery  Date: 8/24/17  Initial weight: 213  Last weight: 159  Current weight: 142    Constipation: none  Reflux: none  Vomiting: none    Diet:    portion sizes 1-3 ounces  Breakfast:  Sushi   Lunch: sushi  Dinner: small piece of fish  Snack: grapes   Protein shake: none    Exercise:  Treadmill and heavy bag- 4 times per week or daily- 1 hour    MVI: 2 per day, calcium, magnesium, vit d and b complex    Vitals:    03/08/18 0854   BP: 127/79   Pulse: (!) 56   Resp: 16   Temp: 98.1 °F (36.7 °C)       Body comp:  Fat Percent:  32.5 %  Fat Mass:  46 lb  FFM:  96 lb  TBW: 70.5 lb  BMR: 1324 kcal    PE:  NAD  RRR  Soft/nt/nd  Incisions: well healed    Labs: pending     A/P: s/p sleeve     Counseling for patient:    Diet: ok for healthy eating.  We talked about a mediteranean type diet  Exercise: ok to decrease to 3 times per week  Vitamins: continue regimen  Co morbidities:     1. Morbid obesity due to excess calories      at goal of bmi 25, this problem is controlled   2. S/P laparoscopic sleeve gastrectomy     3. Hyperlipidemia, unspecified hyperlipidemia type      follow up with labs   4. Arthritis         : I met with the patient for 15 minutes and counseled her for over 50% of that time

## 2018-06-05 ENCOUNTER — LAB VISIT (OUTPATIENT)
Dept: LAB | Facility: HOSPITAL | Age: 53
End: 2018-06-05
Attending: SURGERY
Payer: COMMERCIAL

## 2018-06-05 DIAGNOSIS — M19.90 ARTHRITIS: ICD-10-CM

## 2018-06-05 DIAGNOSIS — Z98.84 S/P LAPAROSCOPIC SLEEVE GASTRECTOMY: ICD-10-CM

## 2018-06-05 DIAGNOSIS — E66.9 OBESITY: Primary | ICD-10-CM

## 2018-06-05 DIAGNOSIS — E78.5 HYPERLIPIDEMIA: ICD-10-CM

## 2018-06-05 LAB
25(OH)D3+25(OH)D2 SERPL-MCNC: 93 NG/ML
ALBUMIN SERPL BCP-MCNC: 4.4 G/DL
ALP SERPL-CCNC: 61 U/L
ALT SERPL W/O P-5'-P-CCNC: 15 U/L
ANION GAP SERPL CALC-SCNC: 10 MMOL/L
AST SERPL-CCNC: 18 U/L
BILIRUB SERPL-MCNC: 0.5 MG/DL
BUN SERPL-MCNC: 12 MG/DL
CALCIUM SERPL-MCNC: 9.7 MG/DL
CHLORIDE SERPL-SCNC: 99 MMOL/L
CHOLEST SERPL-MCNC: 253 MG/DL
CHOLEST/HDLC SERPL: 2.5 {RATIO}
CO2 SERPL-SCNC: 30 MMOL/L
CREAT SERPL-MCNC: 0.7 MG/DL
ERYTHROCYTE [DISTWIDTH] IN BLOOD BY AUTOMATED COUNT: 12 %
EST. GFR  (AFRICAN AMERICAN): >60 ML/MIN/1.73 M^2
EST. GFR  (NON AFRICAN AMERICAN): >60 ML/MIN/1.73 M^2
GLUCOSE SERPL-MCNC: 88 MG/DL
HCT VFR BLD AUTO: 36.9 %
HDLC SERPL-MCNC: 102 MG/DL
HDLC SERPL: 40.3 %
HGB BLD-MCNC: 12.6 G/DL
IRON SERPL-MCNC: 51 UG/DL
LDLC SERPL CALC-MCNC: 136 MG/DL
MCH RBC QN AUTO: 30.1 PG
MCHC RBC AUTO-ENTMCNC: 34.1 G/DL
MCV RBC AUTO: 88 FL
NONHDLC SERPL-MCNC: 151 MG/DL
PLATELET # BLD AUTO: 196 K/UL
PMV BLD AUTO: 10.1 FL
POTASSIUM SERPL-SCNC: 3.9 MMOL/L
PROT SERPL-MCNC: 7.2 G/DL
RBC # BLD AUTO: 4.18 M/UL
SATURATED IRON: 16 %
SODIUM SERPL-SCNC: 139 MMOL/L
TOTAL IRON BINDING CAPACITY: 317 UG/DL
TRANSFERRIN SERPL-MCNC: 214 MG/DL
TRIGL SERPL-MCNC: 75 MG/DL
VIT B12 SERPL-MCNC: 733 PG/ML
WBC # BLD AUTO: 5.34 K/UL

## 2018-06-05 PROCEDURE — 80053 COMPREHEN METABOLIC PANEL: CPT

## 2018-06-05 PROCEDURE — 82607 VITAMIN B-12: CPT

## 2018-06-05 PROCEDURE — 85027 COMPLETE CBC AUTOMATED: CPT

## 2018-06-05 PROCEDURE — 83540 ASSAY OF IRON: CPT

## 2018-06-05 PROCEDURE — 80061 LIPID PANEL: CPT

## 2018-06-05 PROCEDURE — 82306 VITAMIN D 25 HYDROXY: CPT

## 2018-06-05 PROCEDURE — 36415 COLL VENOUS BLD VENIPUNCTURE: CPT

## 2018-06-05 PROCEDURE — 84425 ASSAY OF VITAMIN B-1: CPT

## 2018-06-08 LAB — VIT B1 SERPL-MCNC: 63 UG/L (ref 38–122)

## 2018-06-14 ENCOUNTER — OFFICE VISIT (OUTPATIENT)
Dept: SURGERY | Facility: CLINIC | Age: 53
End: 2018-06-14
Payer: COMMERCIAL

## 2018-06-14 VITALS — RESPIRATION RATE: 16 BRPM | HEIGHT: 63 IN | BODY MASS INDEX: 24.1 KG/M2 | WEIGHT: 136 LBS

## 2018-06-14 DIAGNOSIS — Z98.84 S/P LAPAROSCOPIC SLEEVE GASTRECTOMY: ICD-10-CM

## 2018-06-14 DIAGNOSIS — E66.01 MORBID OBESITY: Primary | ICD-10-CM

## 2018-06-14 DIAGNOSIS — E78.5 HYPERLIPIDEMIA, UNSPECIFIED HYPERLIPIDEMIA TYPE: ICD-10-CM

## 2018-06-14 DIAGNOSIS — K21.9 GASTROESOPHAGEAL REFLUX DISEASE, ESOPHAGITIS PRESENCE NOT SPECIFIED: ICD-10-CM

## 2018-06-14 PROCEDURE — 99999 PR PBB SHADOW E&M-EST. PATIENT-LVL III: CPT | Mod: PBBFAC,,, | Performed by: SURGERY

## 2018-06-14 PROCEDURE — 3008F BODY MASS INDEX DOCD: CPT | Mod: CPTII,S$GLB,, | Performed by: SURGERY

## 2018-06-14 PROCEDURE — 99213 OFFICE O/P EST LOW 20 MIN: CPT | Mod: S$GLB,,, | Performed by: SURGERY

## 2018-06-14 RX ORDER — OMEPRAZOLE 40 MG/1
40 CAPSULE, DELAYED RELEASE ORAL DAILY
Qty: 30 CAPSULE | Refills: 3 | Status: SHIPPED | OUTPATIENT
Start: 2018-06-14

## 2018-06-14 NOTE — PROGRESS NOTES
Post Op Note    Surgery: gastric sleeve surgery  Date: 8/24/17  Initial weight: 213  Last weight: 143  Current weight: 136    Constipation: none  Reflux: some after eating  Vomiting: none    Diet:  Breakfast:  oatmeal  Lunch: macaroni and cheese  Dinner:shredded wheat cereal, yogurt  Snack: candy  Protein shake: none    Exercise:  Bicycling 4 times per week for 1 hour    MVI: 2 per day, calcium, magnesium, and b complex    Vitals:    06/14/18 0819   Resp: 16       Body comp:  Fat Percent:  30 %  Fat Mass:  41 lb  FFM:  95 lb  TBW: 69.5 lb  BMR: 1298 kcal    PE:  NAD  RRR  Soft/nt/nd  Incisions: well healed    Labs: reviewed    A/P: s/p sleeve     Counseling for patient:    Diet: Ok to eat healthy diet.  Should always be cautious with simple sugars like candies and try to avoid  Exercise: continue routine  Vitamins: continue regimen    Co morbidities:     1. Morbid obesity  CBC w/ Auto Differential    CMP    B12    B1    Lipid Panel    Iron Studies    Vitamin D 25 Hydroxy   2. S/P laparoscopic sleeve gastrectomy     3. Gastroesophageal reflux disease, esophagitis presence not specified      continue prilosec consider UGI   4. Hyperlipidemia, unspecified hyperlipidemia type      elevated total cholesterol but ratios normal- defer to PCP       -All above: Evaluated, monitored, and treated with diet and exercise program.        : I met with the patient for 15 minutes and counseled her for over 50% of that time

## 2018-09-04 ENCOUNTER — TELEPHONE (OUTPATIENT)
Dept: BARIATRICS | Facility: CLINIC | Age: 53
End: 2018-09-04

## 2018-09-11 ENCOUNTER — TELEPHONE (OUTPATIENT)
Dept: BARIATRICS | Facility: CLINIC | Age: 53
End: 2018-09-11

## 2018-09-19 ENCOUNTER — PATIENT MESSAGE (OUTPATIENT)
Dept: BARIATRICS | Facility: CLINIC | Age: 53
End: 2018-09-19